# Patient Record
Sex: FEMALE | Race: WHITE | Employment: PART TIME | URBAN - METROPOLITAN AREA
[De-identification: names, ages, dates, MRNs, and addresses within clinical notes are randomized per-mention and may not be internally consistent; named-entity substitution may affect disease eponyms.]

---

## 2017-04-24 ENCOUNTER — HOSPITAL ENCOUNTER (OUTPATIENT)
Dept: ULTRASOUND IMAGING | Facility: CLINIC | Age: 27
Discharge: HOME | End: 2017-04-24
Attending: SPECIALIST | Admitting: SPECIALIST
Payer: COMMERCIAL

## 2017-04-24 DIAGNOSIS — Z85.850 HX OF THYROID CANCER: ICD-10-CM

## 2017-04-24 DIAGNOSIS — E89.0 POSTOPERATIVE HYPOTHYROIDISM: ICD-10-CM

## 2017-04-24 PROCEDURE — 76536 US EXAM OF HEAD AND NECK: CPT

## 2018-03-19 ENCOUNTER — HOSPITAL ENCOUNTER (OUTPATIENT)
Dept: ULTRASOUND IMAGING | Facility: CLINIC | Age: 28
Discharge: HOME | End: 2018-03-19
Payer: COMMERCIAL

## 2018-03-19 DIAGNOSIS — C73 THYROID CANCER (H): ICD-10-CM

## 2018-03-19 PROCEDURE — 76536 US EXAM OF HEAD AND NECK: CPT

## 2019-01-09 ENCOUNTER — OFFICE VISIT (OUTPATIENT)
Dept: OBGYN | Facility: CLINIC | Age: 29
End: 2019-01-09
Payer: COMMERCIAL

## 2019-01-09 VITALS
HEART RATE: 68 BPM | SYSTOLIC BLOOD PRESSURE: 100 MMHG | DIASTOLIC BLOOD PRESSURE: 60 MMHG | BODY MASS INDEX: 21.44 KG/M2 | HEIGHT: 63 IN | WEIGHT: 121 LBS

## 2019-01-09 DIAGNOSIS — N92.6 IRREGULAR MENSES: Primary | ICD-10-CM

## 2019-01-09 LAB
T4 FREE SERPL-MCNC: 1.5 NG/DL (ref 0.76–1.46)
TSH SERPL DL<=0.005 MIU/L-ACNC: 3.09 MU/L (ref 0.4–4)

## 2019-01-09 PROCEDURE — 99203 OFFICE O/P NEW LOW 30 MIN: CPT | Performed by: OBSTETRICS & GYNECOLOGY

## 2019-01-09 PROCEDURE — 84439 ASSAY OF FREE THYROXINE: CPT | Performed by: OBSTETRICS & GYNECOLOGY

## 2019-01-09 PROCEDURE — 84443 ASSAY THYROID STIM HORMONE: CPT | Performed by: OBSTETRICS & GYNECOLOGY

## 2019-01-09 PROCEDURE — 36415 COLL VENOUS BLD VENIPUNCTURE: CPT | Performed by: OBSTETRICS & GYNECOLOGY

## 2019-01-09 RX ORDER — LIFITEGRAST 50 MG/ML
SOLUTION/ DROPS OPHTHALMIC
Refills: 3 | COMMUNITY
Start: 2018-10-15 | End: 2019-04-17

## 2019-01-09 RX ORDER — LEVOTHYROXINE SODIUM 150 MCG
TABLET ORAL
Refills: 2 | COMMUNITY
Start: 2018-12-11

## 2019-01-09 ASSESSMENT — MIFFLIN-ST. JEOR: SCORE: 1242.98

## 2019-01-09 ASSESSMENT — PATIENT HEALTH QUESTIONNAIRE - PHQ9
5. POOR APPETITE OR OVEREATING: NOT AT ALL
SUM OF ALL RESPONSES TO PHQ QUESTIONS 1-9: 0

## 2019-01-09 ASSESSMENT — ANXIETY QUESTIONNAIRES
IF YOU CHECKED OFF ANY PROBLEMS ON THIS QUESTIONNAIRE, HOW DIFFICULT HAVE THESE PROBLEMS MADE IT FOR YOU TO DO YOUR WORK, TAKE CARE OF THINGS AT HOME, OR GET ALONG WITH OTHER PEOPLE: NOT DIFFICULT AT ALL
1. FEELING NERVOUS, ANXIOUS, OR ON EDGE: NOT AT ALL
GAD7 TOTAL SCORE: 0
6. BECOMING EASILY ANNOYED OR IRRITABLE: NOT AT ALL
7. FEELING AFRAID AS IF SOMETHING AWFUL MIGHT HAPPEN: NOT AT ALL
2. NOT BEING ABLE TO STOP OR CONTROL WORRYING: NOT AT ALL
5. BEING SO RESTLESS THAT IT IS HARD TO SIT STILL: NOT AT ALL
3. WORRYING TOO MUCH ABOUT DIFFERENT THINGS: NOT AT ALL

## 2019-01-09 NOTE — PATIENT INSTRUCTIONS
Long discussion today regarding recent menstrual changes.  Given her regularity of cycles, no concerns with slightly heavier bleeding and midcycle spotting.  Discussed hormonal changes with ovulation.  Discussed options for managing recent changes in the form of hormonal medications (OCPs, nuvaring, IUD, nexplanon).  Will check TSH/T4 today and have records sent to Dr. Hall.  If normal, will observe bleeding patterns over the next few months --if issues were to persist or worsen, will likely call for OCP rx.  Would likely do a low dose OCP for cycle regulation such as Aviane.

## 2019-01-09 NOTE — PROGRESS NOTES
SUBJECTIVE:                                                   Hao Guaman is a 28 year old female who presents to clinic today for the following health issue(s):  Patient presents with:  Abnormal Bleeding Problem: c/o heavy bleeding during periods and spotting inbetween last 3 cycles       HPI:  New patient --self referral --here today to discuss recent menstrual changes.  Over the last 3 months, has noticed midcycle spotting and slightly heavier menses.  Has had 1-3d spotting/bleeding with last few cycles.  In December, had brown spotting for 1-2d and then light bleeding necessitating a liner for 1-2 days --all occurred ~2wks prior to menses.  Then had fairly normal period.  Cycles consistent/regular at 28d.  Usually 4-5d bleeding.   Has noticed slightly heavier bleeding in first 1-2d with last few periods and then tapers quickly.  Mild cramping.   No other intermenstrual bleeding/spotting  Never SA; has never been on hormonal medications/contraception  Hx papillary thryoid cancer in  with total thyroidectomy; followed by Dr. 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  No new meds, etc    Patient's last menstrual period was 2018 (approximate)..   Patient is not sexually active, .  Using not sexually active for contraception.    reports that  has never smoked. she has never used smokeless tobacco.    STD testing offered?  N/A, Never sexually active    Health maintenance updated:  yes    Today's PHQ-9 Score:   PHQ-9 SCORE 2019   PHQ-9 Total Score 0     Today's AMILCAR-7 Score:   AMILCAR-7 SCORE 2019   Total Score 0       Problem list and histories reviewed &  "adjusted, as indicated.  Additional history: as documented.    Patient Active Problem List   Diagnosis     Papillary thyroid carcinoma (H)     Postoperative hypothyroidism     Past Surgical History:   Procedure Laterality Date     ENT SURGERY  2010    wisdom teeth      THYROIDECTOMY  10/30/2013    papillary thyroid cancer       Social History     Tobacco Use     Smoking status: Never Smoker     Smokeless tobacco: Never Used   Substance Use Topics     Alcohol use: Yes     Comment: social      Problem (# of Occurrences) Relation (Name,Age of Onset)    Breast Cancer (1) Paternal Grandmother    Heart Disease (1) Paternal Grandfather    Lung Cancer (1) Maternal Grandmother    Prostate Cancer (2) Father, Maternal Grandfather       Negative family history of: Diabetes, Coronary Artery Disease, Hypertension, Hyperlipidemia, Cerebrovascular Disease, Colon Cancer, Other Cancer, Depression, Anxiety Disorder, Mental Illness, Substance Abuse, Anesthesia Reaction, Asthma, Osteoporosis, Genetic Disorder, Thyroid Disease, Obesity, Unknown/Adopted            Current Outpatient Medications   Medication Sig     SYNTHROID 150 MCG tablet      XIIDRA 5 % opthalmic solution INSTILL ONE GTT IN OU BID APPROXIMATELY 12 H APART     No current facility-administered medications for this visit.      Allergies   Allergen Reactions     Amoxicillin Hives     Clindamycin Hives       ROS:  12 point review of systems negative other than symptoms noted below.  Genitourinary: Heavy Bleeding with Period and Spotting    OBJECTIVE:     /60   Pulse 68   Ht 1.6 m (5' 3\")   Wt 54.9 kg (121 lb)   LMP 12/27/2018 (Approximate)   BMI 21.43 kg/m    Body mass index is 21.43 kg/m .    Exam:  Constitutional:  Appearance: Well nourished, well developed alert, in no acute distress  Neurologic/Psychiatric:  Mental Status:  Oriented X3      In-Clinic Test Results:  No results found for this or any previous visit (from the past 24 hour(s)).    ASSESSMENT/PLAN:  "                                                       ICD-10-CM    1. Irregular menses N92.6 T4 free     TSH       Patient Instructions   Long discussion today regarding recent menstrual changes.  Given her regularity of cycles, no concerns with slightly heavier bleeding and midcycle spotting.  Discussed hormonal changes with ovulation.  Discussed options for managing recent changes in the form of hormonal medications (OCPs, nuvaring, IUD, nexplanon).  Will check TSH/T4 today and have records sent to Dr. Hall.  If normal, will observe bleeding patterns over the next few months --if issues were to persist or worsen, will likely call for OCP rx.  Would likely do a low dose OCP for cycle regulation such as Aviane.      Jacquelin Holm MD  [facility] [place]

## 2019-01-09 NOTE — RESULT ENCOUNTER NOTE
Please inform of results --TSH normal and free T4 slightly elevated.  Dr. Hall monitors her synthroid dosing so I will ask that Hao discuss these results with Dr. Hall to see if she recommends any decrease in her dosing.  If not, will follow the plan we came up with at our visit this AM

## 2019-01-10 ASSESSMENT — ANXIETY QUESTIONNAIRES: GAD7 TOTAL SCORE: 0

## 2019-02-04 ENCOUNTER — OFFICE VISIT (OUTPATIENT)
Dept: INTERNAL MEDICINE | Facility: CLINIC | Age: 29
End: 2019-02-04
Payer: COMMERCIAL

## 2019-02-04 VITALS
BODY MASS INDEX: 19.91 KG/M2 | RESPIRATION RATE: 20 BRPM | HEART RATE: 82 BPM | TEMPERATURE: 98.7 F | OXYGEN SATURATION: 100 % | DIASTOLIC BLOOD PRESSURE: 68 MMHG | HEIGHT: 64 IN | SYSTOLIC BLOOD PRESSURE: 102 MMHG | WEIGHT: 116.6 LBS

## 2019-02-04 DIAGNOSIS — C73 PAPILLARY THYROID CARCINOMA (H): ICD-10-CM

## 2019-02-04 DIAGNOSIS — H04.553 STENOSIS OF BOTH LACRIMAL DUCTS: ICD-10-CM

## 2019-02-04 DIAGNOSIS — E89.0 POSTOPERATIVE HYPOTHYROIDISM: ICD-10-CM

## 2019-02-04 DIAGNOSIS — Z01.818 PREOP GENERAL PHYSICAL EXAM: Primary | ICD-10-CM

## 2019-02-04 LAB
ERYTHROCYTE [DISTWIDTH] IN BLOOD BY AUTOMATED COUNT: 12.8 % (ref 10–15)
HCT VFR BLD AUTO: 43.7 % (ref 35–47)
HGB BLD-MCNC: 14.6 G/DL (ref 11.7–15.7)
MCH RBC QN AUTO: 31.6 PG (ref 26.5–33)
MCHC RBC AUTO-ENTMCNC: 33.4 G/DL (ref 31.5–36.5)
MCV RBC AUTO: 95 FL (ref 78–100)
PLATELET # BLD AUTO: 340 10E9/L (ref 150–450)
RBC # BLD AUTO: 4.62 10E12/L (ref 3.8–5.2)
WBC # BLD AUTO: 4.7 10E9/L (ref 4–11)

## 2019-02-04 PROCEDURE — 80048 BASIC METABOLIC PNL TOTAL CA: CPT | Performed by: INTERNAL MEDICINE

## 2019-02-04 PROCEDURE — 99214 OFFICE O/P EST MOD 30 MIN: CPT | Performed by: INTERNAL MEDICINE

## 2019-02-04 PROCEDURE — 85027 COMPLETE CBC AUTOMATED: CPT | Performed by: INTERNAL MEDICINE

## 2019-02-04 PROCEDURE — 36415 COLL VENOUS BLD VENIPUNCTURE: CPT | Performed by: INTERNAL MEDICINE

## 2019-02-04 ASSESSMENT — MIFFLIN-ST. JEOR: SCORE: 1230.95

## 2019-02-04 NOTE — NURSING NOTE
"/68   Pulse 82   Temp 98.7  F (37.1  C) (Oral)   Resp 20   Ht 1.613 m (5' 3.5\")   Wt 52.9 kg (116 lb 9.6 oz)   LMP 01/28/2019   SpO2 100%   BMI 20.33 kg/m    Patient in for Pre-Op.  Lashell Hurley CMA    "

## 2019-02-04 NOTE — PROGRESS NOTES
[place] [address]  [facility] [address]  [phone]  Dept: [phone]    PRE-OP EVALUATION:  Today's date: 2019    Hao Guaman (: 1990) presents for pre-operative evaluation assessment as requested by Dr. Rincon.  She requires evaluation and anesthesia risk assessment prior to undergoing surgery/procedure for treatment of Rt DCR .    Fax number for surgical facility: [facility] [phone]  Primary Physician: Joe Bergeron  Type of Anesthesia Anticipated: General    Patient has a Health Care Directive or Living Will:  NO    Preop Questions 2019   Who is doing your surgery? Dr. Rincon   What are you having done? Dacryocystorhinostomy   Date of Surgery/Procedure: 19   Facility or Hospital where procedure/surgery will be performed: [facility]   1.  Do you have a history of Heart attack, stroke, stent, coronary bypass surgery, or other heart surgery? No   2.  Do you ever have any pain or discomfort in your chest? No   3.  Do you have a history of  Heart Failure? No   4.   Are you troubled by shortness of breath when:  walking on a level surface, or up a slight hill, or at night? No   5.  Do you currently have a cold, bronchitis or other respiratory infection? No   6.  Do you have a cough, shortness of breath, or wheezing? No   7.  Do you sometimes get pains in the calves of your legs when you walk? No   8. Do you or anyone in your family have previous history of blood clots? No   9.  Do you or does anyone in your family have a serious bleeding problem such as prolonged bleeding following surgeries or cuts? No   10. Have you ever had problems with anemia or been told to take iron pills? No   11. Have you had any abnormal blood loss such as black, tarry or bloody stools, or abnormal vaginal bleeding? No   12. Have you ever had a blood transfusion? No   13. Have you or any of your relatives ever had problems with  anesthesia? No   14. Do you have sleep apnea, excessive snoring or daytime drowsiness? No   15. Do you have any prosthetic heart valves? No   16. Do you have prosthetic joints? No   17. Is there any chance that you may be pregnant? No         HPI:     HPI related to upcoming procedure: trouble with dry eyes and plugged tear ducts going in for opening right tear duct and left punctoplasty      See problem list for active medical problems.  Problems all longstanding and stable, except as noted/documented.  See ROS for pertinent symptoms related to these conditions.                                                                                                                                                          .    MEDICAL HISTORY:     Patient Active Problem List    Diagnosis Date Noted     Postoperative hypothyroidism 09/11/2015     Priority: Medium     Papillary thyroid carcinoma (H)      Priority: Medium     thyroidectomy and radioactive iodine        Past Medical History:   Diagnosis Date     Brain lesion     noted when she was 16 with numbness issues; no more numbness through body since she was 22     Hypothyroid      Papillary thyroid carcinoma (H) 2013    thyroidectomy and radioactive iodine,sees endocrinologist      Past Surgical History:   Procedure Laterality Date     ENT SURGERY  2010    wisdom teeth      THYROIDECTOMY  10/30/2013    papillary thyroid cancer      Current Outpatient Medications   Medication Sig Dispense Refill     SYNTHROID 150 MCG tablet   2     XIIDRA 5 % opthalmic solution INSTILL ONE GTT IN OU BID APPROXIMATELY 12 H APART  3     OTC products: None, except as noted above    Allergies   Allergen Reactions     Amoxicillin Hives     Clindamycin Hives      Latex Allergy: NO    Social History     Tobacco Use     Smoking status: Never Smoker     Smokeless tobacco: Never Used   Substance Use Topics     Alcohol use: Yes     Comment: social     History   Drug Use No       REVIEW OF SYSTEMS:  "  CONSTITUTIONAL: NEGATIVE for fever, chills, change in weight  INTEGUMENTARY/SKIN: NEGATIVE for worrisome rashes, moles or lesions  EYES: NEGATIVE for vision changes or irritation  ENT/MOUTH: NEGATIVE for ear, mouth and throat problems  RESP: NEGATIVE for significant cough or SOB  BREAST: NEGATIVE for masses, tenderness or discharge  CV: NEGATIVE for chest pain, palpitations or peripheral edema  GI: NEGATIVE for nausea, abdominal pain, heartburn, or change in bowel habits  : NEGATIVE for frequency, dysuria, or hematuria  MUSCULOSKELETAL: NEGATIVE for significant arthralgias or myalgia  NEURO: NEGATIVE for weakness, dizziness or paresthesias  ENDOCRINE: NEGATIVE for temperature intolerance, skin/hair changes  HEME: NEGATIVE for bleeding problems  PSYCHIATRIC: NEGATIVE for changes in mood or affect    EXAM:   /68   Pulse 82   Temp 98.7  F (37.1  C) (Oral)   Resp 20   Ht 1.613 m (5' 3.5\")   Wt 52.9 kg (116 lb 9.6 oz)   LMP 01/28/2019   SpO2 100%   BMI 20.33 kg/m      GENERAL APPEARANCE: healthy, alert and no distress     EYES: EOMI, PERRL     HENT: ear canals and TM's normal and nose and mouth without ulcers or lesions     NECK: no adenopathy, no asymmetry, masses, or scars and thyroid normal to palpation     RESP: lungs clear to auscultation - no rales, rhonchi or wheezes     CV: regular rates and rhythm, normal S1 S2, no S3 or S4 and no murmur, click or rub     ABDOMEN:  soft, nontender, no HSM or masses and bowel sounds normal     MS: extremities normal- no gross deformities noted, no evidence of inflammation in joints, FROM in all extremities.     SKIN: no suspicious lesions or rashes     NEURO: Normal strength and tone, sensory exam grossly normal, mentation intact and speech normal     PSYCH: mentation appears normal. and affect normal/bright     LYMPHATICS: No cervical adenopathy    DIAGNOSTICS:     Labs Drawn and in Process:   Unresulted Labs Ordered in the Past 30 Days of this Admission     " Date and Time Order Name Status Description    2/4/2019 0955 BASIC METABOLIC PANEL In process     2/4/2019 0955 CBC WITH PLATELETS In process           Recent Labs   Lab Test 10/19/16  0934 09/11/15  0845   HGB 13.6 13.6   PLT  --  269    139   POTASSIUM 4.1 3.6   CR 0.80 0.74        IMPRESSION:   Diagnosis/reason for consult: tear duct surgery bilateral     The proposed surgical procedure is considered LOW risk.    REVISED CARDIAC RISK INDEX  The patient has the following serious cardiovascular risks for perioperative complications such as (MI, PE, VFib and 3  AV Block):  No serious cardiac risks  INTERPRETATION: 0 risks: Class I (very low risk - 0.4% complication rate)    The patient has the following additional risks for perioperative complications:  No identified additional risks      ICD-10-CM    1. Preop general physical exam Z01.818        RECOMMENDATIONS:             APPROVAL GIVEN to proceed with proposed procedure, without further diagnostic evaluation       Signed Electronically by: Chana Snell MD    Copy of this evaluation report is provided to requesting physician.    [facility] Preop Guidelines    Revised Cardiac Risk Index

## 2019-02-05 LAB
ANION GAP SERPL CALCULATED.3IONS-SCNC: 4 MMOL/L (ref 3–14)
BUN SERPL-MCNC: 13 MG/DL (ref 7–30)
CALCIUM SERPL-MCNC: 9.1 MG/DL (ref 8.5–10.1)
CHLORIDE SERPL-SCNC: 106 MMOL/L (ref 94–109)
CO2 SERPL-SCNC: 26 MMOL/L (ref 20–32)
CREAT SERPL-MCNC: 0.75 MG/DL (ref 0.52–1.04)
GFR SERPL CREATININE-BSD FRML MDRD: >90 ML/MIN/{1.73_M2}
GLUCOSE SERPL-MCNC: 84 MG/DL (ref 70–99)
POTASSIUM SERPL-SCNC: 4.1 MMOL/L (ref 3.4–5.3)
SODIUM SERPL-SCNC: 136 MMOL/L (ref 133–144)

## 2019-04-17 ENCOUNTER — OFFICE VISIT (OUTPATIENT)
Dept: INTERNAL MEDICINE | Facility: CLINIC | Age: 29
End: 2019-04-17
Payer: COMMERCIAL

## 2019-04-17 VITALS
OXYGEN SATURATION: 100 % | RESPIRATION RATE: 20 BRPM | TEMPERATURE: 97.4 F | BODY MASS INDEX: 19.04 KG/M2 | DIASTOLIC BLOOD PRESSURE: 64 MMHG | HEIGHT: 67 IN | HEART RATE: 78 BPM | SYSTOLIC BLOOD PRESSURE: 102 MMHG | WEIGHT: 121.3 LBS

## 2019-04-17 DIAGNOSIS — B07.0 PLANTAR WARTS: ICD-10-CM

## 2019-04-17 DIAGNOSIS — Z00.00 ROUTINE HISTORY AND PHYSICAL EXAMINATION OF ADULT: Primary | ICD-10-CM

## 2019-04-17 DIAGNOSIS — E89.0 POSTOPERATIVE HYPOTHYROIDISM: ICD-10-CM

## 2019-04-17 PROCEDURE — 17110 DESTRUCTION B9 LES UP TO 14: CPT | Performed by: INTERNAL MEDICINE

## 2019-04-17 PROCEDURE — 99395 PREV VISIT EST AGE 18-39: CPT | Mod: 25 | Performed by: INTERNAL MEDICINE

## 2019-04-17 PROCEDURE — G0145 SCR C/V CYTO,THINLAYER,RESCR: HCPCS | Performed by: INTERNAL MEDICINE

## 2019-04-17 PROCEDURE — 87624 HPV HI-RISK TYP POOLED RSLT: CPT | Performed by: INTERNAL MEDICINE

## 2019-04-17 ASSESSMENT — ENCOUNTER SYMPTOMS
HEMATOCHEZIA: 0
DYSURIA: 0
ROS SKIN COMMENTS: WARTS
PALPITATIONS: 0
DIARRHEA: 0
PARESTHESIAS: 0
FREQUENCY: 0
NAUSEA: 0
DIZZINESS: 0
SHORTNESS OF BREATH: 0
ARTHRALGIAS: 0
JOINT SWELLING: 0
WEAKNESS: 0
HEMATURIA: 0
ABDOMINAL PAIN: 0
MYALGIAS: 0
FEVER: 0
EYE PAIN: 0
COUGH: 0
HEADACHES: 0
NERVOUS/ANXIOUS: 0
BREAST MASS: 0
SORE THROAT: 0
HEARTBURN: 0
CONSTIPATION: 0
CHILLS: 0

## 2019-04-17 ASSESSMENT — MIFFLIN-ST. JEOR: SCORE: 1299.9

## 2019-04-17 NOTE — PROGRESS NOTES
SUBJECTIVE:   CC: Hao Guaman is an 28 year old woman who presents for preventive health visit.     Healthy Habits:     Getting at least 3 servings of Calcium per day:  Yes    Bi-annual eye exam:  Yes    Dental care twice a year:  Yes    Sleep apnea or symptoms of sleep apnea:  None    Diet:  Regular (no restrictions)    Frequency of exercise:  2-3 days/week    Duration of exercise:  30-45 minutes    Taking medications regularly:  Yes    Medication side effects:  None    PHQ-2 Total Score: 0    Additional concerns today:  Yes      Patient also complains of 2 warts on her plantar left foot since 1 year, has used over-the-counter Compound W without relief.      Hypothyroidism  SEES ENDOCRINOLOGIST    Today's PHQ-2 Score:   PHQ-2 ( 1999 [employer]) 4/17/2019   Q1: Little interest or pleasure in doing things 0   Q2: Feeling down, depressed or hopeless 0   PHQ-2 Score 0   Q1: Little interest or pleasure in doing things Not at all   Q2: Feeling down, depressed or hopeless Not at all   PHQ-2 Score 0       Abuse: Current or Past(Physical, Sexual or Emotional)- No  Do you feel safe in your environment? Yes      Past Medical History:   Diagnosis Date     Brain lesion     noted when she was 16 with numbness issues; no more numbness through body since she was 22     Hypothyroid      Papillary thyroid carcinoma (H) 2013    thyroidectomy and radioactive iodine,sees endocrinologist        Past Surgical History:   Procedure Laterality Date     ENT SURGERY  2010    wisdom teeth      THYROIDECTOMY  10/30/2013    papillary thyroid cancer        Current Outpatient Medications   Medication Sig Dispense Refill     SYNTHROID 150 MCG tablet   2       Family History   Problem Relation Age of Onset     Prostate Cancer Father      Lung Cancer Maternal Grandmother      Prostate Cancer Maternal Grandfather      Breast Cancer Paternal Grandmother      Heart Disease Paternal Grandfather      Diabetes No family hx of      Coronary Artery Disease  No family hx of      Hypertension No family hx of      Hyperlipidemia No family hx of      Cerebrovascular Disease No family hx of      Colon Cancer No family hx of      Other Cancer No family hx of      Depression No family hx of      Anxiety Disorder No family hx of      Mental Illness No family hx of      Substance Abuse No family hx of      Anesthesia Reaction No family hx of      Asthma No family hx of      Osteoporosis No family hx of      Genetic Disorder No family hx of      Thyroid Disease No family hx of      Obesity No family hx of      Unknown/Adopted No family hx of        Social History     Tobacco Use     Smoking status: Never Smoker     Smokeless tobacco: Never Used   Substance Use Topics     Alcohol use: Yes     Comment: social     If you drink alcohol do you typically have >3 drinks per day or >7 drinks per week? No    Alcohol Use 4/17/2019   Prescreen: >3 drinks/day or >7 drinks/week? No   Prescreen: >3 drinks/day or >7 drinks/week? -   No flowsheet data found.    Reviewed orders with patient.  Reviewed health maintenance and updated orders accordingly - Yes       Mammogram not appropriate for this patient based on age.       History of abnormal Pap smear: NO - age 21-29 PAP every 3 years recommended  PAP / HPV 10/19/2016   PAP NIL     Reviewed and updated as needed this visit by clinical staff  Tobacco  Allergies  Meds  Med Hx  Surg Hx  Fam Hx  Soc Hx        Reviewed and updated as needed this visit by Provider            Review of Systems   Constitutional: Negative for chills and fever.   HENT: Negative for congestion, ear pain, hearing loss and sore throat.    Eyes: Negative for pain and visual disturbance.   Respiratory: Negative for cough and shortness of breath.    Cardiovascular: Negative for chest pain, palpitations and peripheral edema.   Gastrointestinal: Negative for abdominal pain, constipation, diarrhea, heartburn, hematochezia and nausea.   Breasts:  Negative for tenderness,  "breast mass and discharge.   Genitourinary: Negative for dysuria, frequency, genital sores, hematuria, pelvic pain, urgency, vaginal bleeding and vaginal discharge.   Musculoskeletal: Negative for arthralgias, joint swelling and myalgias.   Skin: Negative for rash.        Warts     Neurological: Negative for dizziness, weakness, headaches and paresthesias.   Psychiatric/Behavioral: Negative for mood changes. The patient is not nervous/anxious.            OBJECTIVE:   /64   Pulse 78   Temp 97.4  F (36.3  C) (Oral)   Resp 20   Ht 1.689 m (5' 6.5\")   Wt 55 kg (121 lb 4.8 oz)   LMP 03/30/2019   SpO2 100%   BMI 19.29 kg/m    Physical Exam  GENERAL: healthy, alert and no distress  EYES: Eyes grossly normal to inspection, PERRL and conjunctivae and sclerae normal  HENT: ear canals and TM's normal, nose and mouth without ulcers or lesions  NECK: no adenopathy, no asymmetry, masses, or scars and thyroid normal to palpation  RESP: lungs clear to auscultation - no rales, rhonchi or wheezes  BREAST: normal without masses, tenderness or nipple discharge and no palpable axillary masses or adenopathy  CV: regular rate and rhythm, normal S1 S2, no S3 or S4, no murmur, click or rub, no peripheral edema and peripheral pulses strong  ABDOMEN: soft, nontender, no hepatosplenomegaly, no masses and bowel sounds normal   (female): normal female external genitalia, normal urethral meatus, vaginal mucosa pink, moist, well rugated, and normal cervix/adnexa without tenderness, masses or discharge, Pap obtained.  MS: no gross musculoskeletal defects noted, no edema  SKIN: 2   warts noted on plantar left foot 1 beneath the great toe and the second beneath the left fifth toe.  NEURO: Normal strength and tone, mentation intact and speech normal  PSYCH: mentation appears normal, affect normal/bright      ASSESSMENT/PLAN:      (Z00.00) Routine history and physical examination of adult  (primary encounter diagnosis)  Plan: pt would " "like to skip labs  , pap obtained -Pap imaged thin layer screen with HPV -         recommended age 30 - 65 years (select HPV order        below)       (E89.0) Postoperative hypothyroidism  Plan: sees endocrinologist     (B07.0) Plantar warts  Plan: using liquid nitrogen 2 warts were treated using freeze-thaw -freeze method x 3 . Pt tolerated procedure   The etiology of common warts were discussed,  Warm soapy water soaks and sanding also recommended.  The patient is to return every 4-6 weeks until the wart has resolved.Instructions to take care of the wart after liqid nitrogen was given.       COUNSELING:  Reviewed preventive health counseling, as reflected in patient instructions       Regular exercise       Healthy diet/nutrition    BP Readings from Last 1 Encounters:   04/17/19 102/64     Estimated body mass index is 19.29 kg/m  as calculated from the following:    Height as of this encounter: 1.689 m (5' 6.5\").    Weight as of this encounter: 55 kg (121 lb 4.8 oz).           reports that she has never smoked. She has never used smokeless tobacco.      Counseling Resources:  ATP IV Guidelines  Pooled Cohorts Equation Calculator  Breast Cancer Risk Calculator  FRAX Risk Assessment  ICSI Preventive Guidelines  Dietary Guidelines for Americans, 2010  USDA's MyPlate  ASA Prophylaxis  Lung CA Screening    Joe Bergeron MD  [facility]       "

## 2019-04-22 LAB
COPATH REPORT: NORMAL
PAP: NORMAL

## 2019-04-23 LAB
FINAL DIAGNOSIS: NORMAL
HPV HR 12 DNA CVX QL NAA+PROBE: NEGATIVE
HPV16 DNA SPEC QL NAA+PROBE: NEGATIVE
HPV18 DNA SPEC QL NAA+PROBE: NEGATIVE
SPECIMEN DESCRIPTION: NORMAL
SPECIMEN SOURCE CVX/VAG CYTO: NORMAL

## 2019-11-05 DIAGNOSIS — Z11.1 SCREENING EXAMINATION FOR PULMONARY TUBERCULOSIS: ICD-10-CM

## 2019-11-05 PROCEDURE — 86481 TB AG RESPONSE T-CELL SUSP: CPT | Performed by: INTERNAL MEDICINE

## 2019-11-05 PROCEDURE — 36415 COLL VENOUS BLD VENIPUNCTURE: CPT | Performed by: INTERNAL MEDICINE

## 2019-11-07 LAB
GAMMA INTERFERON BACKGROUND BLD IA-ACNC: 0.02 IU/ML
M TB IFN-G BLD-IMP: NEGATIVE
M TB IFN-G CD4+ BCKGRND COR BLD-ACNC: >10 IU/ML
MITOGEN IGNF BCKGRD COR BLD-ACNC: 0 IU/ML
MITOGEN IGNF BCKGRD COR BLD-ACNC: 0.01 IU/ML

## 2020-03-25 ENCOUNTER — VIRTUAL VISIT (OUTPATIENT)
Dept: INTERNAL MEDICINE | Facility: CLINIC | Age: 30
End: 2020-03-25
Payer: COMMERCIAL

## 2020-03-25 DIAGNOSIS — R04.0 EPISTAXIS: Primary | ICD-10-CM

## 2020-03-25 PROCEDURE — 99213 OFFICE O/P EST LOW 20 MIN: CPT | Mod: TEL | Performed by: INTERNAL MEDICINE

## 2020-03-25 NOTE — PROGRESS NOTES
Subjective     Hao Guaman is a 29 year old female who presents to clinic today for the following health issues:    HPI     Hx nose bleeds. But had a longer lasting bloody nose a month ago and this AM. Todays lasted 20 minutes. Hx DCR-rhinostomy.      Heavy blooding for 20 minutes in total took 30  Minutes to get it to stop. Had another episode a month ago with heavy bleeding but not quite as bad as this one. Almost went to ER it was so bad.     She has a surgically made additional eye duct with a valve but her nosebleed was so severe she had blood coming up through the duct. Her surgery was over 1 year ago. That surgeon did not feel her bleeding was related to the surgery. She has a history of thyroid cancer status post resection. But otherwise is healthy.       Reviewed and updated as needed this visit by Provider         Review of Systems   ROS COMP: Constitutional, HEENT, cardiovascular, pulmonary, gi and gu systems are negative, except as otherwise noted.      Objective    There were no vitals taken for this visit.  There is no height or weight on file to calculate BMI.  Physical Exam             Assessment & Plan     1. Epistaxis  With heavy bleeding Will refer to   - OTOLARYNGOLOGY REFERRAL     A total of 7 minutes was spent with the patient in  above discussion.     No follow-ups on file.    Chana Snell MD  [facility]

## 2020-06-01 ENCOUNTER — HOSPITAL ENCOUNTER (OUTPATIENT)
Dept: ULTRASOUND IMAGING | Facility: CLINIC | Age: 30
Discharge: HOME | End: 2020-06-01
Attending: SPECIALIST | Admitting: SPECIALIST
Payer: COMMERCIAL

## 2020-06-01 DIAGNOSIS — C73 THYROID CANCER (H): ICD-10-CM

## 2020-06-01 PROCEDURE — 76536 US EXAM OF HEAD AND NECK: CPT

## 2020-06-26 ENCOUNTER — VIRTUAL VISIT (OUTPATIENT)
Dept: INTERNAL MEDICINE | Facility: CLINIC | Age: 30
End: 2020-06-26
Payer: COMMERCIAL

## 2020-06-26 DIAGNOSIS — F41.9 ANXIETY: Primary | ICD-10-CM

## 2020-06-26 DIAGNOSIS — E89.0 POSTOPERATIVE HYPOTHYROIDISM: ICD-10-CM

## 2020-06-26 PROCEDURE — 99214 OFFICE O/P EST MOD 30 MIN: CPT | Mod: GT | Performed by: INTERNAL MEDICINE

## 2020-06-26 RX ORDER — CETIRIZINE HYDROCHLORIDE 5 MG/1
10 TABLET ORAL DAILY
COMMUNITY

## 2020-06-26 ASSESSMENT — PATIENT HEALTH QUESTIONNAIRE - PHQ9: 5. POOR APPETITE OR OVEREATING: MORE THAN HALF THE DAYS

## 2020-06-26 ASSESSMENT — ANXIETY QUESTIONNAIRES
2. NOT BEING ABLE TO STOP OR CONTROL WORRYING: NEARLY EVERY DAY
GAD7 TOTAL SCORE: 12
3. WORRYING TOO MUCH ABOUT DIFFERENT THINGS: MORE THAN HALF THE DAYS
5. BEING SO RESTLESS THAT IT IS HARD TO SIT STILL: SEVERAL DAYS
IF YOU CHECKED OFF ANY PROBLEMS ON THIS QUESTIONNAIRE, HOW DIFFICULT HAVE THESE PROBLEMS MADE IT FOR YOU TO DO YOUR WORK, TAKE CARE OF THINGS AT HOME, OR GET ALONG WITH OTHER PEOPLE: SOMEWHAT DIFFICULT
1. FEELING NERVOUS, ANXIOUS, OR ON EDGE: NEARLY EVERY DAY
6. BECOMING EASILY ANNOYED OR IRRITABLE: SEVERAL DAYS
7. FEELING AFRAID AS IF SOMETHING AWFUL MIGHT HAPPEN: NOT AT ALL

## 2020-06-26 NOTE — PROGRESS NOTES
"Hao Guaman is a 29 year old female who is being evaluated via a billable video visit.      The patient has been notified of following:     \"This video visit will be conducted via a call between you and your physician/provider. We have found that certain health care needs can be provided without the need for an in-person physical exam.  This service lets us provide the care you need with a video conversation.  If a prescription is necessary we can send it directly to your pharmacy.  If lab work is needed we can place an order for that and you can then stop by our lab to have the test done at a later time.    Video visits are billed at different rates depending on your insurance coverage.  Please reach out to your insurance provider with any questions.    If during the course of the call the physician/provider feels a video visit is not appropriate, you will not be charged for this service.\"    Patient has given verbal consent for Video visit? Yes    Will anyone else be joining your video visit? No        Video Start Time: 1:33 PM    Subjective     Hao Guaman is a 29 year old female who presents today via video visit for the following health issues:    HPI  Anxiety      Pt c/o  symptoms of anxiety , feeling anxious, taking deep breaths, heart racing, worrying sicne 2-3 months, pt has been seeing a therapist,    Are you having other symptoms that might be associated with anxiety? Yes:  heart palpitations    Have you had a significant life event? OTHER: Getting  in August , going to graduate school , moving to a different apartment     Are you feeling depressed? No    Do you have any concerns with your use of alcohol or other drugs? No    no symptoms of depression, no suicidal ideation or thoughts        AMILCAR-7 SCORE 1/9/2019 6/26/2020   Total Score 0 12       Hypothyroidism Follow-up      Since last visit, patient describes the following symptoms: Weight stable, no hair loss, no skin changes, no " constipation, no loose stools      How many servings of fruits and vegetables do you eat daily?  2-3    On average, how many sweetened beverages do you drink each day (Examples: soda, juice, sweet tea, etc.  Do NOT count diet or artificially sweetened beverages)?   0    How many days per week do you exercise enough to make your heart beat faster? 4    How many minutes a day do you exercise enough to make your heart beat faster? 30 - 60    How many days per week do you miss taking your medication? 0       Patient Active Problem List   Diagnosis     Papillary thyroid carcinoma (H)     Postoperative hypothyroidism     Past Surgical History:   Procedure Laterality Date     ENT SURGERY  2010    wisdom teeth      THYROIDECTOMY  10/30/2013    papillary thyroid cancer        Social History     Tobacco Use     Smoking status: Never Smoker     Smokeless tobacco: Never Used   Substance Use Topics     Alcohol use: Yes     Comment: social     Family History   Problem Relation Age of Onset     Prostate Cancer Father      Lung Cancer Maternal Grandmother      Prostate Cancer Maternal Grandfather      Breast Cancer Paternal Grandmother      Heart Disease Paternal Grandfather      Diabetes No family hx of      Coronary Artery Disease No family hx of      Hypertension No family hx of      Hyperlipidemia No family hx of      Cerebrovascular Disease No family hx of      Colon Cancer No family hx of      Other Cancer No family hx of      Depression No family hx of      Anxiety Disorder No family hx of      Mental Illness No family hx of      Substance Abuse No family hx of      Anesthesia Reaction No family hx of      Asthma No family hx of      Osteoporosis No family hx of      Genetic Disorder No family hx of      Thyroid Disease No family hx of      Obesity No family hx of      Unknown/Adopted No family hx of          Current Outpatient Medications   Medication Sig Dispense Refill     cetirizine (ZYRTEC) 5 MG tablet Take 10 mg by  mouth daily       sertraline (ZOLOFT) 50 MG tablet Take 1 tablet (50 mg) by mouth daily 30 tablet 0     SYNTHROID 150 MCG tablet   2       Reviewed and updated as needed this visit by Provider         Review of Systems   CONSTITUTIONAL: NEGATIVE for fever, chills, change in weight  EYES: NEGATIVE for vision changes or irritation  ENT/MOUTH: NEGATIVE for ear, mouth and throat problems  RESP: NEGATIVE for significant cough or SOB  CV: NEGATIVE for chest pain, palpitations or peripheral edema  MUSCULOSKELETAL: NEGATIVE for significant arthralgias or myalgia  ENDOCRINE: NEGATIVE for temperature intolerance, skin/hair changes  PSYCHIATRIC: anxiety      Objective             Physical Exam     GENERAL: Healthy, alert and no distress  EYES: Eyes grossly normal to inspection.  No discharge or erythema, or obvious scleral/conjunctival abnormalities.  RESP: No audible wheeze, cough, or visible cyanosis.  No visible retractions or increased work of breathing.    SKIN: Visible skin clear.    NEURO: Cranial nerves grossly intact.  Mentation and speech appropriate for age.  PSYCH: Mentation appears normal, affect normal/bright, judgement and insight intact, normal speech and appearance well-groomed.          Assessment & Plan     (F41.9) Anxiety  (primary encounter diagnosis)  Plan: Started on sertraline (ZOLOFT) 50 MG tablet once daily as directed.explained clearly about the medication,insructions and side effects.  Patient was advised to have video visit in 3 to 4 weeks.            (E89.0) Postoperative hypothyroidism  Plan: Patient sees endocrinologist, requesting the thyroid test, check TSH with free T4 reflex, on Synthroid 150 mcg daily         Return in about 4 weeks (around 7/24/2020) for Anxiety follow up.    Joe Bergeron MD  [facility]      Video-Visit Details    Type of service:  Video Visit    Video End Time:1:45 PM    Originating Location (pt. Location): Home    Distant Location (provider  location):  [facility]     Platform used for Video Visit: Doximity    Return in about 4 weeks (around 7/24/2020) for Anxiety follow up.       Joe Bergeron MD

## 2020-06-27 ASSESSMENT — ANXIETY QUESTIONNAIRES: GAD7 TOTAL SCORE: 12

## 2020-07-06 ENCOUNTER — NURSE TRIAGE (OUTPATIENT)
Dept: INTERNAL MEDICINE | Facility: CLINIC | Age: 30
End: 2020-07-06

## 2020-07-06 NOTE — TELEPHONE ENCOUNTER
Please advise patient to avoid caffeine, I have already ordered the thyroid test go ahead and make a lab appointment to do the thyroid test, this is a one-time episode if it happens again they can do a Holter monitor and to monitor the heart rate, please inform patient

## 2020-07-06 NOTE — TELEPHONE ENCOUNTER
Patient had an episode of heart racing today that was frightening to her and she wonders if it may have been related to recently starting Zoloft on 6/26/20.  States she ran for about 40 minutes outside today, this is not new for her to run in the heat.  She made sure she was hydrated before the run and drank lots of water afterward.  Was doing stretching exercises and cooling down at home and about 20 minutes after completing her run she felt like her heart was racing.  This lasted about 10 minutes. It did not feel like palpitations or irregular heart rate, just very fast.  Was unable to get a reading of pulse as rate was too fast but believes it was at least 180 bpm.  Denies chest pain, shortness of breath or dizziness.  States she has never experienced this before.  She is under no new stress, sleeps well, drinks 1-2 cups of coffee per day.  AMANDA Durham R.N.

## 2020-07-07 DIAGNOSIS — E89.0 POSTOPERATIVE HYPOTHYROIDISM: ICD-10-CM

## 2020-07-07 PROCEDURE — 36415 COLL VENOUS BLD VENIPUNCTURE: CPT | Performed by: INTERNAL MEDICINE

## 2020-07-07 PROCEDURE — 84443 ASSAY THYROID STIM HORMONE: CPT | Performed by: INTERNAL MEDICINE

## 2020-07-08 LAB — TSH SERPL DL<=0.005 MIU/L-ACNC: 0.78 MU/L (ref 0.4–4)

## 2020-07-27 ENCOUNTER — VIRTUAL VISIT (OUTPATIENT)
Dept: INTERNAL MEDICINE | Facility: CLINIC | Age: 30
End: 2020-07-27
Payer: COMMERCIAL

## 2020-07-27 DIAGNOSIS — F41.9 ANXIETY: Primary | ICD-10-CM

## 2020-07-27 PROCEDURE — 99213 OFFICE O/P EST LOW 20 MIN: CPT | Mod: 95 | Performed by: INTERNAL MEDICINE

## 2020-07-27 ASSESSMENT — ANXIETY QUESTIONNAIRES
IF YOU CHECKED OFF ANY PROBLEMS ON THIS QUESTIONNAIRE, HOW DIFFICULT HAVE THESE PROBLEMS MADE IT FOR YOU TO DO YOUR WORK, TAKE CARE OF THINGS AT HOME, OR GET ALONG WITH OTHER PEOPLE: NOT DIFFICULT AT ALL
1. FEELING NERVOUS, ANXIOUS, OR ON EDGE: NOT AT ALL
2. NOT BEING ABLE TO STOP OR CONTROL WORRYING: NOT AT ALL
6. BECOMING EASILY ANNOYED OR IRRITABLE: SEVERAL DAYS
GAD7 TOTAL SCORE: 4
3. WORRYING TOO MUCH ABOUT DIFFERENT THINGS: SEVERAL DAYS
5. BEING SO RESTLESS THAT IT IS HARD TO SIT STILL: SEVERAL DAYS
7. FEELING AFRAID AS IF SOMETHING AWFUL MIGHT HAPPEN: SEVERAL DAYS

## 2020-07-27 ASSESSMENT — PATIENT HEALTH QUESTIONNAIRE - PHQ9: 5. POOR APPETITE OR OVEREATING: NOT AT ALL

## 2020-07-27 NOTE — PROGRESS NOTES
"Hao Guaman is a 29 year old female who is being evaluated via a billable video visit.      The patient has been notified of following:     \"This video visit will be conducted via a call between you and your physician/provider. We have found that certain health care needs can be provided without the need for an in-person physical exam.  This service lets us provide the care you need with a video conversation.  If a prescription is necessary we can send it directly to your pharmacy.  If lab work is needed we can place an order for that and you can then stop by our lab to have the test done at a later time.    Video visits are billed at different rates depending on your insurance coverage.  Please reach out to your insurance provider with any questions.    If during the course of the call the physician/provider feels a video visit is not appropriate, you will not be charged for this service.\"    Patient has given verbal consent for Video visit? Yes  How would you like to obtain your AVS? MyChart  If you are dropped from the video visit, the video invite should be resent to: Text to cell phone: [phone]  Will anyone else be joining your video visit? No      Subjective     Hao Guaman is a 29 year old female who presents today via video visit for the following health issues:    [facility]  Video Start Time: 8:30 AM     Anxiety Follow-Up    How are you doing with your anxiety since your last visit? Improved , on Zoloft 50 mg daily with good symptom improvement    Are you having other symptoms that might be associated with anxiety? No    Have you had a significant life event? No     Are you feeling depressed? No    Do you have any concerns with your use of alcohol or other drugs? No      AMILCAR-7 SCORE 1/9/2019 6/26/2020 7/27/2020   Total Score 0 12 4          Patient Active Problem List   Diagnosis     Papillary thyroid carcinoma (H)     Postoperative hypothyroidism     Past Surgical History:   Procedure Laterality " Date     ENT SURGERY  2010    wisdom teeth      THYROIDECTOMY  10/30/2013    papillary thyroid cancer        Social History     Tobacco Use     Smoking status: Never Smoker     Smokeless tobacco: Never Used   Substance Use Topics     Alcohol use: Yes     Comment: social     Family History   Problem Relation Age of Onset     Prostate Cancer Father      Lung Cancer Maternal Grandmother      Prostate Cancer Maternal Grandfather      Breast Cancer Paternal Grandmother      Heart Disease Paternal Grandfather      Diabetes No family hx of      Coronary Artery Disease No family hx of      Hypertension No family hx of      Hyperlipidemia No family hx of      Cerebrovascular Disease No family hx of      Colon Cancer No family hx of      Other Cancer No family hx of      Depression No family hx of      Anxiety Disorder No family hx of      Mental Illness No family hx of      Substance Abuse No family hx of      Anesthesia Reaction No family hx of      Asthma No family hx of      Osteoporosis No family hx of      Genetic Disorder No family hx of      Thyroid Disease No family hx of      Obesity No family hx of      Unknown/Adopted No family hx of          Current Outpatient Medications   Medication Sig Dispense Refill     cetirizine (ZYRTEC) 5 MG tablet Take 10 mg by mouth daily       sertraline (ZOLOFT) 50 MG tablet Take 1 tablet (50 mg) by mouth daily 90 tablet 1     SYNTHROID 150 MCG tablet   2       Reviewed and updated as needed this visit by Provider         Review of Systems   CONSTITUTIONAL: NEGATIVE for fever, chills, change in weight  RESP: NEGATIVE for significant cough or SOB  CV: NEGATIVE for chest pain, palpitations or peripheral edema  NEURO: NEGATIVE for weakness, dizziness or paresthesias  PSYCHIATRIC: NEGATIVE for changes in mood or affect      Objective          Physical Exam     GENERAL: Healthy, alert and no distress  EYES: Eyes grossly normal to inspection.  No discharge or erythema, or obvious  scleral/conjunctival abnormalities.  RESP: No audible wheeze, cough, or visible cyanosis.  No visible retractions or increased work of breathing.    NEURO:    Mentation and speech appropriate for age.  PSYCH: Mentation appears normal, affect normal/bright, judgement and insight intact, normal speech and appearance well-groomed.           Assessment & Plan     (F41.9) Anxiety  (primary encounter diagnosis)  Comment:  Stable  Plan: sertraline (ZOLOFT) 50 MG tablet refilled as directed.explained clearly about the medication,insructions and side effects.  Follow-up in 6 months           Return in about 6 months (around 1/27/2021).    Joe Bergeron MD  [facility]      Video-Visit Details    Type of service:  Video Visit    Video End Time:8:37 AM    Originating Location (pt. Location): Home    Distant Location (provider location):  [facility]     Platform used for Video Visit: Doximity    Return in about 6 months (around 1/27/2021).       Joe Bergeron MD

## 2020-07-28 ASSESSMENT — ANXIETY QUESTIONNAIRES: GAD7 TOTAL SCORE: 4

## 2020-09-14 ENCOUNTER — OFFICE VISIT (OUTPATIENT)
Dept: URGENT CARE | Facility: URGENT CARE | Age: 30
End: 2020-09-14
Payer: COMMERCIAL

## 2020-09-14 ENCOUNTER — RESULTS ONLY (OUTPATIENT)
Dept: LAB | Age: 30
End: 2020-09-14

## 2020-09-14 DIAGNOSIS — Z53.9 ERRONEOUS ENCOUNTER--DISREGARD: Primary | ICD-10-CM

## 2020-09-15 LAB
SARS-COV-2 RNA SPEC QL NAA+PROBE: NOT DETECTED
SPECIMEN SOURCE: NORMAL

## 2020-12-17 DIAGNOSIS — Z20.822 EXPOSURE TO COVID-19 VIRUS: Primary | ICD-10-CM

## 2020-12-17 PROCEDURE — 86769 SARS-COV-2 COVID-19 ANTIBODY: CPT | Performed by: NURSE PRACTITIONER

## 2020-12-17 PROCEDURE — 36415 COLL VENOUS BLD VENIPUNCTURE: CPT | Performed by: NURSE PRACTITIONER

## 2020-12-20 ENCOUNTER — HEALTH MAINTENANCE LETTER (OUTPATIENT)
Age: 30
End: 2020-12-20

## 2020-12-21 LAB
COVID-19 ANTIBODY IGG: NEGATIVE
LAB TEST METHOD: NORMAL

## 2021-10-03 ENCOUNTER — HEALTH MAINTENANCE LETTER (OUTPATIENT)
Age: 31
End: 2021-10-03

## 2022-01-23 ENCOUNTER — HEALTH MAINTENANCE LETTER (OUTPATIENT)
Age: 32
End: 2022-01-23

## 2022-09-10 ENCOUNTER — HEALTH MAINTENANCE LETTER (OUTPATIENT)
Age: 32
End: 2022-09-10

## 2023-04-30 ENCOUNTER — HEALTH MAINTENANCE LETTER (OUTPATIENT)
Age: 33
End: 2023-04-30

## 2023-06-07 ENCOUNTER — HOSPITAL ENCOUNTER (OUTPATIENT)
Dept: ULTRASOUND IMAGING | Facility: CLINIC | Age: 33
Discharge: HOME | End: 2023-06-07
Attending: SPECIALIST | Admitting: SPECIALIST
Payer: COMMERCIAL

## 2023-06-07 DIAGNOSIS — Z85.9 HISTORY OF CANCER: ICD-10-CM

## 2023-06-07 PROCEDURE — 76536 US EXAM OF HEAD AND NECK: CPT

## 2024-07-07 ENCOUNTER — HEALTH MAINTENANCE LETTER (OUTPATIENT)
Age: 34
End: 2024-07-07

## 2025-07-13 ENCOUNTER — HEALTH MAINTENANCE LETTER (OUTPATIENT)
Age: 35
End: 2025-07-13